# Patient Record
Sex: MALE | Race: ASIAN | URBAN - METROPOLITAN AREA
[De-identification: names, ages, dates, MRNs, and addresses within clinical notes are randomized per-mention and may not be internally consistent; named-entity substitution may affect disease eponyms.]

---

## 2020-11-16 ENCOUNTER — APPOINTMENT (OUTPATIENT)
Dept: URBAN - METROPOLITAN AREA SURGERY 9 | Age: 45
Setting detail: DERMATOLOGY
End: 2020-11-16

## 2020-11-16 DIAGNOSIS — L73.8 OTHER SPECIFIED FOLLICULAR DISORDERS: ICD-10-CM

## 2020-11-16 PROCEDURE — OTHER COUNSELING: OTHER

## 2020-11-16 PROCEDURE — OTHER OTHER: OTHER

## 2020-11-16 PROCEDURE — OTHER OTHER (COSMETIC): OTHER

## 2020-11-16 PROCEDURE — OTHER PRESCRIPTION: OTHER

## 2020-11-16 RX ORDER — HYDROQUINONE 4 %
CREAM (GRAM) TOPICAL
Qty: 1 | Refills: 1 | COMMUNITY
Start: 2020-11-16

## 2020-11-16 ASSESSMENT — LOCATION DETAILED DESCRIPTION DERM
LOCATION DETAILED: SUPERIOR MID FOREHEAD
LOCATION DETAILED: LEFT CENTRAL MALAR CHEEK
LOCATION DETAILED: RIGHT LATERAL MALAR CHEEK

## 2020-11-16 ASSESSMENT — LOCATION ZONE DERM: LOCATION ZONE: FACE

## 2020-11-16 ASSESSMENT — LOCATION SIMPLE DESCRIPTION DERM
LOCATION SIMPLE: SUPERIOR FOREHEAD
LOCATION SIMPLE: LEFT CHEEK
LOCATION SIMPLE: RIGHT CHEEK

## 2020-11-16 NOTE — PROCEDURE: OTHER (COSMETIC)
Detail Level: Zone
Price (Use Numbers Only, No Special Characters Or $): 75
Other (Free Text): $75 consult fee

## 2020-11-16 NOTE — PROCEDURE: OTHER
Detail Level: Detailed
Other (Free Text): Discussed destructive treatment modalities including electrodessication, fractional CO2 laser and TCA 50-75% peel via CROSS technique/spot treatment. Discussed continuing daily use of retinoid and frequent application of sunscreen. Will plan on starting hydroquinone before scheduling test spots, will purchase at the office. Will schedule appointment for test spots using each listed above treatment modality. Cost for test spots will be $150 total. Reviewed risk of hyperpigmentation/scarring with any of these treatment modalities at length with the patient.\\n\\nPt has discussed low dose oral isotretinoin use with Dr. Salazar and does not wish to pursue this treatment at the current time.
Note Text (......Xxx Chief Complaint.): This diagnosis correlates with the

## 2020-12-07 ENCOUNTER — APPOINTMENT (OUTPATIENT)
Dept: URBAN - METROPOLITAN AREA SURGERY 9 | Age: 45
Setting detail: DERMATOLOGY
End: 2020-12-07

## 2020-12-07 DIAGNOSIS — L73.8 OTHER SPECIFIED FOLLICULAR DISORDERS: ICD-10-CM

## 2020-12-07 PROCEDURE — OTHER BENIGN DESTRUCTION COSMETIC: OTHER

## 2020-12-07 PROCEDURE — OTHER OTHER: OTHER

## 2020-12-07 ASSESSMENT — LOCATION DETAILED DESCRIPTION DERM
LOCATION DETAILED: RIGHT SUPERIOR LATERAL MALAR CHEEK
LOCATION DETAILED: LEFT INFERIOR LATERAL FOREHEAD
LOCATION DETAILED: LEFT MID TEMPLE
LOCATION DETAILED: RIGHT CENTRAL MALAR CHEEK
LOCATION DETAILED: LEFT INFERIOR MEDIAL FOREHEAD
LOCATION DETAILED: RIGHT LATERAL MALAR CHEEK
LOCATION DETAILED: RIGHT INFERIOR FOREHEAD

## 2020-12-07 ASSESSMENT — LOCATION ZONE DERM: LOCATION ZONE: FACE

## 2020-12-07 ASSESSMENT — LOCATION SIMPLE DESCRIPTION DERM
LOCATION SIMPLE: RIGHT CHEEK
LOCATION SIMPLE: RIGHT FOREHEAD
LOCATION SIMPLE: LEFT FOREHEAD
LOCATION SIMPLE: LEFT TEMPLE

## 2020-12-07 NOTE — HPI: COSMETIC (LASER RESURFACING)
Have You Had Laser Resurfacing Before?: has not had previous treatments
Additional History: Here for test spot treatment with several destructive therapies including electrodessication, TCA and CO2 laser

## 2020-12-07 NOTE — PROCEDURE: BENIGN DESTRUCTION COSMETIC
Post-Care Instructions: I reviewed with the patient in detail post-care instructions. Patient is to wear sunprotection, and avoid picking at any of the treated lesions. Pt may apply Vaseline to crusted or scabbing areas.
Anesthesia Type: 1% lidocaine with epinephrine and a 1:10 solution of 8.4% sodium bicarbonate
Consent: The patient's consent was obtained including but not limited to risks of crusting, scabbing, blistering, scarring, darker or lighter pigmentary change, recurrence, incomplete removal and infection.
Topical Anesthesia?: 6% lidocaine, 6% tetracaine
Anesthesia Volume In Cc: 1.5
Price (Use Numbers Only, No Special Characters Or $): 150.0
Detail Level: Zone

## 2020-12-07 NOTE — PROCEDURE: OTHER
Detail Level: Detailed
Note Text (......Xxx Chief Complaint.): This diagnosis correlates with the
Other (Free Text): See photo for specific treatment modalities relating to locations. 2 spots on the right lateral superior forehead treated with electrodessication. 1 spot on the right lateral inferior forehead treated with electrodessication with epilating needle. 2 spots on the left lateral forehead treated with TCA 70%. 2 spots on the right cheek treated with ablative CO2 laser. \\n\\nTest spots performed today, will determine efficacy at f/u visit and choose appropriate treatment modality.

## 2020-12-23 ENCOUNTER — RX ONLY (RX ONLY)
Age: 45
End: 2020-12-23

## 2020-12-23 RX ORDER — VALACYCLOVIR HYDROCHLORIDE 500 MG/1
TABLET, FILM COATED ORAL
Qty: 10 | Refills: 0 | Status: CANCELLED

## 2021-01-04 ENCOUNTER — APPOINTMENT (OUTPATIENT)
Dept: URBAN - METROPOLITAN AREA SURGERY 9 | Age: 46
Setting detail: DERMATOLOGY
End: 2021-01-04

## 2021-01-04 DIAGNOSIS — L73.8 OTHER SPECIFIED FOLLICULAR DISORDERS: ICD-10-CM

## 2021-01-04 PROCEDURE — OTHER OTHER (COSMETIC): OTHER

## 2021-01-04 PROCEDURE — OTHER OTHER: OTHER

## 2021-01-04 PROCEDURE — OTHER PRESCRIPTION: OTHER

## 2021-01-04 RX ORDER — HYDROQUINONE 4 %
CREAM (GRAM) TOPICAL
Qty: 1 | Refills: 1

## 2021-01-04 NOTE — PROCEDURE: OTHER (COSMETIC)
Price (Use Numbers Only, No Special Characters Or $): 043 Price (Use Numbers Only, No Special Characters Or $): 767

## 2021-01-08 ENCOUNTER — APPOINTMENT (OUTPATIENT)
Dept: URBAN - METROPOLITAN AREA SURGERY 9 | Age: 46
Setting detail: DERMATOLOGY
End: 2021-01-08

## 2021-01-08 DIAGNOSIS — L73.8 OTHER SPECIFIED FOLLICULAR DISORDERS: ICD-10-CM

## 2021-01-08 PROCEDURE — OTHER OTHER: OTHER

## 2021-01-08 NOTE — PROCEDURE: OTHER
Render Risk Assessment In Note?: no
Detail Level: Detailed
Note Text (......Xxx Chief Complaint.): This diagnosis correlates with the
Other (Free Text): healing very well and healing as expected post 75% TCA. Reassured patient that sites are still actively healing and expect to see crusting and swelling at treated spots at this point post-procedure (4 days). Sites on forehead seem to have resolved with very minimal PIH present. Pt reassured that sites healing as expected and appears to be on course for excellent result from treatment.\\n\\nRecheck in 1 month with plan for possible additional treatment.\\n\\nPt going skiing early Feb: reviewed importance of sun protection during his trip with SPF 30 or higher. He verbalizes understanding.

## 2021-02-10 ENCOUNTER — APPOINTMENT (OUTPATIENT)
Dept: URBAN - METROPOLITAN AREA SURGERY 9 | Age: 46
Setting detail: DERMATOLOGY
End: 2021-02-11

## 2021-02-10 DIAGNOSIS — L73.8 OTHER SPECIFIED FOLLICULAR DISORDERS: ICD-10-CM

## 2021-02-10 PROCEDURE — OTHER OTHER (COSMETIC): OTHER

## 2021-02-10 PROCEDURE — OTHER OTHER: OTHER

## 2021-02-10 NOTE — PROCEDURE: OTHER (COSMETIC)
Price (Use Numbers Only, No Special Characters Or $): 388 Price (Use Numbers Only, No Special Characters Or $): 398

## 2021-02-10 NOTE — PROCEDURE: OTHER
Render Risk Assessment In Note?: no
Detail Level: Detailed
Note Text (......Xxx Chief Complaint.): This diagnosis correlates with the
Other (Free Text): healing very well and healing as expected post 75% TCA. \\n\\nRecheck in 2 months with plan for possible treatment. \\n\\nApplied and reviewed importance of sun protection during his trip with SPF 30 or higher. He verbalizes understanding.
Other (Free Text): All test spots examined, and areas of TCA peel application appear to have healed best with no scarring and no residual sebaceous hyperplasia.\\n\\nReviewed that 2-3 treatments may be required to obtain full benefit.\\n\\nSkin was degreased with alcohol. TCA Cross peel 75% applied as spot treatment with the wooden tip of a broken CTA to specifically spot treat each area of sebaceous hyperplasia until moderate frosting achieved. Due to patient movement at one point in procedure, area on left lower cheek streaked slightly. Saline-soaked gauze applied post procedure. Aquaphor and sunscreen applied. Post care reviewed\\n\\n$250

## 2021-02-10 NOTE — PROCEDURE: OTHER (COSMETIC)
Other (Free Text): Tx #2 \\n$250\\n\\nAfter healed from TCA, resume tretinoin qhs and hydroquinone qam

## 2021-02-11 ENCOUNTER — RX ONLY (RX ONLY)
Age: 46
End: 2021-02-11

## 2021-02-11 RX ORDER — TRETIONIN 1 MG/G
CREAM TOPICAL
Qty: 1 | Refills: 10 | Status: ERX | COMMUNITY
Start: 2021-02-11

## 2021-04-05 ENCOUNTER — RX ONLY (RX ONLY)
Age: 46
End: 2021-04-05

## 2021-04-05 RX ORDER — TRETIONIN 1 MG/G
CREAM TOPICAL
Qty: 1 | Refills: 10 | Status: ERX

## 2021-04-22 ENCOUNTER — APPOINTMENT (OUTPATIENT)
Dept: URBAN - METROPOLITAN AREA SURGERY 9 | Age: 46
Setting detail: DERMATOLOGY
End: 2021-04-23

## 2021-04-22 DIAGNOSIS — L73.8 OTHER SPECIFIED FOLLICULAR DISORDERS: ICD-10-CM

## 2021-04-22 PROCEDURE — OTHER BENIGN DESTRUCTION COSMETIC MULTI: OTHER

## 2021-04-22 PROCEDURE — OTHER OTHER: OTHER

## 2021-04-22 ASSESSMENT — LOCATION DETAILED DESCRIPTION DERM
LOCATION DETAILED: RIGHT CENTRAL MALAR CHEEK
LOCATION DETAILED: LEFT CENTRAL MALAR CHEEK

## 2021-04-22 ASSESSMENT — LOCATION SIMPLE DESCRIPTION DERM
LOCATION SIMPLE: LEFT CHEEK
LOCATION SIMPLE: RIGHT CHEEK

## 2021-04-22 ASSESSMENT — LOCATION ZONE DERM: LOCATION ZONE: FACE

## 2021-04-22 NOTE — PROCEDURE: OTHER
Detail Level: Detailed
Note Text (......Xxx Chief Complaint.): This diagnosis correlates with the
Render Risk Assessment In Note?: no
Other (Free Text): $250\\n\\nElectrodessication with epilating needle at low wattage (3) after anesthetizing with 2%LEB

## 2022-03-15 ENCOUNTER — RX ONLY (RX ONLY)
Age: 47
End: 2022-03-15

## 2022-03-15 RX ORDER — HYDROQUINONE 4 %
CREAM (GRAM) TOPICAL
Qty: 30 | Refills: 2 | Status: ERX

## 2022-03-15 RX ORDER — TRETIONIN 1 MG/G
CREAM TOPICAL
Qty: 45 | Refills: 10 | Status: ERX